# Patient Record
Sex: MALE | Race: WHITE | NOT HISPANIC OR LATINO
[De-identification: names, ages, dates, MRNs, and addresses within clinical notes are randomized per-mention and may not be internally consistent; named-entity substitution may affect disease eponyms.]

---

## 2022-03-29 PROBLEM — Z00.00 ENCOUNTER FOR PREVENTIVE HEALTH EXAMINATION: Status: ACTIVE | Noted: 2022-03-29

## 2022-06-03 ENCOUNTER — APPOINTMENT (OUTPATIENT)
Dept: CARDIOLOGY | Facility: CLINIC | Age: 67
End: 2022-06-03
Payer: MEDICARE

## 2022-06-03 ENCOUNTER — NON-APPOINTMENT (OUTPATIENT)
Age: 67
End: 2022-06-03

## 2022-06-03 VITALS — SYSTOLIC BLOOD PRESSURE: 124 MMHG | DIASTOLIC BLOOD PRESSURE: 90 MMHG

## 2022-06-03 VITALS
BODY MASS INDEX: 22.97 KG/M2 | HEIGHT: 74 IN | WEIGHT: 179 LBS | RESPIRATION RATE: 16 BRPM | DIASTOLIC BLOOD PRESSURE: 91 MMHG | OXYGEN SATURATION: 99 % | HEART RATE: 76 BPM | SYSTOLIC BLOOD PRESSURE: 153 MMHG

## 2022-06-03 DIAGNOSIS — Z72.3 LACK OF PHYSICAL EXERCISE: ICD-10-CM

## 2022-06-03 DIAGNOSIS — Z78.9 OTHER SPECIFIED HEALTH STATUS: ICD-10-CM

## 2022-06-03 DIAGNOSIS — I49.9 CARDIAC ARRHYTHMIA, UNSPECIFIED: ICD-10-CM

## 2022-06-03 PROCEDURE — 99204 OFFICE O/P NEW MOD 45 MIN: CPT

## 2022-06-03 PROCEDURE — 93000 ELECTROCARDIOGRAM COMPLETE: CPT

## 2022-06-03 RX ORDER — VENLAFAXINE HCL 50 MG
TABLET ORAL
Refills: 0 | Status: ACTIVE | COMMUNITY

## 2022-06-03 NOTE — HISTORY OF PRESENT ILLNESS
[FreeTextEntry1] : Patient is a 67yo M here for cardiac evaluation of chest pain. For past year has been having pinching chest pains. Has been told has irregular heart beat. States once when donating blood was told HR irregular. CP seems somewhat random, no clear exertional or stress component.  Can be when watching TV or driving, no clear pattern. Lasts about a minute, no associated symptoms. Stays active but no regular exercise. No SOB. Patient denies PND/orthopnea/edema/palpitations/syncope/claudication. 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 ACtive around house, gardening. STates very handy\par \par ROS: GI negative, all others negative

## 2022-06-03 NOTE — DISCUSSION/SUMMARY
[FreeTextEntry1] : Patient is a 67yo M here for cardiac evaluation of atypical chest pain\par -ECG mildly abnorrmal with LAFB, ? irregular heart rate in past at PMD and when donating blood but no sx\par -Recommend cardiac work up to evaluate his CP in setting of abnormal ECG \par -ASCV 13.1%, will consider statin and discuss at follow up \par \par 1. Echo and nuclear stress testing to evaluate CP/ECG\par 2. Recommend aggressive diet and lifestyle modifications \par 3. Follow up after testing \par 4. ? mild white coat HTN, re-eval during stress testing and follow up as well as at PMD

## 2022-06-03 NOTE — ASSESSMENT
[FreeTextEntry1] : ECG: SR, LAFB \par \par \par  HDL 56  TG 78 (3/2022) \par \par AORTIC DUPLEX (3/2022) \par 1. No AAA

## 2022-06-21 ENCOUNTER — APPOINTMENT (OUTPATIENT)
Dept: CARDIOLOGY | Facility: CLINIC | Age: 67
End: 2022-06-21
Payer: MEDICARE

## 2022-06-21 PROCEDURE — A9500: CPT

## 2022-06-21 PROCEDURE — 93015 CV STRESS TEST SUPVJ I&R: CPT

## 2022-06-21 PROCEDURE — 78452 HT MUSCLE IMAGE SPECT MULT: CPT

## 2022-07-12 ENCOUNTER — APPOINTMENT (OUTPATIENT)
Dept: CARDIOLOGY | Facility: CLINIC | Age: 67
End: 2022-07-12

## 2022-07-12 PROCEDURE — 93306 TTE W/DOPPLER COMPLETE: CPT

## 2022-08-01 ENCOUNTER — APPOINTMENT (OUTPATIENT)
Dept: CARDIOLOGY | Facility: CLINIC | Age: 67
End: 2022-08-01

## 2022-08-01 VITALS
SYSTOLIC BLOOD PRESSURE: 128 MMHG | BODY MASS INDEX: 22.59 KG/M2 | DIASTOLIC BLOOD PRESSURE: 82 MMHG | WEIGHT: 176 LBS | HEART RATE: 68 BPM | RESPIRATION RATE: 16 BRPM | HEIGHT: 74 IN

## 2022-08-01 DIAGNOSIS — R07.9 CHEST PAIN, UNSPECIFIED: ICD-10-CM

## 2022-08-01 DIAGNOSIS — R94.31 ABNORMAL ELECTROCARDIOGRAM [ECG] [EKG]: ICD-10-CM

## 2022-08-01 PROCEDURE — 99214 OFFICE O/P EST MOD 30 MIN: CPT

## 2022-08-01 RX ORDER — ATORVASTATIN CALCIUM 10 MG/1
10 TABLET, FILM COATED ORAL
Qty: 90 | Refills: 1 | Status: ACTIVE | COMMUNITY
Start: 2022-08-01 | End: 1900-01-01

## 2022-08-01 RX ORDER — VENLAFAXINE HYDROCHLORIDE 150 MG/1
150 CAPSULE, EXTENDED RELEASE ORAL
Qty: 90 | Refills: 0 | Status: ACTIVE | COMMUNITY
Start: 2022-03-01

## 2022-08-01 NOTE — DISCUSSION/SUMMARY
[FreeTextEntry1] : Patient is a 67yo M here for cardiac evaluation of atypical chest pain\par -ECG mildly abnorrmal with LAFB, ? irregular heart rate in past at PMD and when donating blood but no sx\par -Echo 7/2022 unremarkable\par -Ex nuclear stress test 7/2022 negative, excellent functional capacity \par -ASCV 13.1%, will start statin\par \par 1. STart atorvastatin 10mg qhs to reduce CV risk based on ASCVD\par 2. Recommend aggressive diet and lifestyle modifications \par 3. Recommend 30 minutes moderate intensity aerobic activity 5 days per week \par 4. NO BP meds, continue to monitor with PMD\par 5. ANnual follow up

## 2022-08-01 NOTE — ASSESSMENT
[FreeTextEntry1] : \par \par  HDL 56  TG 78 (3/2022) \par \par AORTIC DUPLEX (3/2022) \par 1. No AAA\par \par ECHO 7/2022:\par 1. Normal LV size, systolic and diastolic function. EF 55-60%\par 2. Normal RV/LA/RA \par 3. Trivial MR\par \par EXERCISE NUCLEAR STRESS TEST \par 1. Negative for ischemia/infarct, EF 71%\par 2. Achieved 10 min 30 seconds, 11 METS\par 3. BP hypertensive baseline, normal response \par 4. DTS = 11

## 2022-08-01 NOTE — HISTORY OF PRESENT ILLNESS
[FreeTextEntry1] : Patient is a 67yo M here for cardiac evaluation follow up. Due to chest pain underwent cardiac work up after last OV.  For past year has been having pinching chest pains. Has been told has irregular heart beat. States once when donating blood was told HR irregular. CP seems somewhat random, no clear exertional or stress component.  Can be when watching TV or driving, no clear pattern. Lasts about a minute, no associated symptoms. Stays active but no regular exercise. No SOB. Patient denies PND/orthopnea/edema/palpitations/syncope/claudication. 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 ACtive around house, gardening. STates very handy\par \par ROS: GI and  negative

## 2022-11-18 RX ORDER — KIT FOR THE PREPARATION OF TECHNETIUM TC99M SESTAMIBI 1 MG/5ML
INJECTION, POWDER, LYOPHILIZED, FOR SOLUTION PARENTERAL
Refills: 0 | Status: COMPLETED | OUTPATIENT
Start: 2022-11-18

## 2022-11-18 RX ADMIN — KIT FOR THE PREPARATION OF TECHNETIUM TC99M SESTAMIBI 0: 1 INJECTION, POWDER, LYOPHILIZED, FOR SOLUTION PARENTERAL at 00:00

## 2023-03-03 ENCOUNTER — APPOINTMENT (OUTPATIENT)
Dept: DERMATOLOGY | Facility: CLINIC | Age: 68
End: 2023-03-03
Payer: MEDICARE

## 2023-03-03 PROCEDURE — 11900 INJECT SKIN LESIONS </W 7: CPT

## 2023-03-03 PROCEDURE — 99203 OFFICE O/P NEW LOW 30 MIN: CPT | Mod: 25

## 2023-03-31 ENCOUNTER — APPOINTMENT (OUTPATIENT)
Dept: DERMATOLOGY | Facility: CLINIC | Age: 68
End: 2023-03-31